# Patient Record
Sex: MALE | Race: AMERICAN INDIAN OR ALASKA NATIVE
[De-identification: names, ages, dates, MRNs, and addresses within clinical notes are randomized per-mention and may not be internally consistent; named-entity substitution may affect disease eponyms.]

---

## 2021-07-08 ENCOUNTER — HOSPITAL ENCOUNTER (EMERGENCY)
Dept: HOSPITAL 5 - ED | Age: 71
Discharge: HOME | End: 2021-07-08
Payer: MEDICARE

## 2021-07-08 VITALS — SYSTOLIC BLOOD PRESSURE: 143 MMHG | DIASTOLIC BLOOD PRESSURE: 77 MMHG

## 2021-07-08 DIAGNOSIS — J20.9: Primary | ICD-10-CM

## 2021-07-08 DIAGNOSIS — I10: ICD-10-CM

## 2021-07-08 DIAGNOSIS — E11.9: ICD-10-CM

## 2021-07-08 DIAGNOSIS — Z88.6: ICD-10-CM

## 2021-07-08 DIAGNOSIS — F17.200: ICD-10-CM

## 2021-07-08 DIAGNOSIS — Z98.890: ICD-10-CM

## 2021-07-08 LAB
ALBUMIN SERPL-MCNC: 3.4 G/DL (ref 3.9–5)
ALT SERPL-CCNC: 35 UNITS/L (ref 7–56)
APTT BLD: 32.4 SEC. (ref 24.2–36.6)
BAND NEUTROPHILS # (MANUAL): 0 K/MM3
BILIRUB UR QL STRIP: (no result)
BLOOD UR QL VISUAL: (no result)
BUN SERPL-MCNC: 11 MG/DL (ref 9–20)
BUN/CREAT SERPL: 11 %
CALCIUM SERPL-MCNC: 9.1 MG/DL (ref 8.4–10.2)
HCT VFR BLD CALC: 34.9 % (ref 35.5–45.6)
HEMOLYSIS INDEX: 3
HGB BLD-MCNC: 11.5 GM/DL (ref 11.8–15.2)
INR PPP: 1.01 (ref 0.87–1.13)
MCHC RBC AUTO-ENTMCNC: 33 % (ref 32–34)
MCV RBC AUTO: 83 FL (ref 84–94)
MYELOCYTES # (MANUAL): 0 K/MM3
PH UR STRIP: 6 [PH] (ref 5–7)
PLATELET # BLD: 118 K/MM3 (ref 140–440)
PROMYELOCYTES # (MANUAL): 0 K/MM3
PROT UR STRIP-MCNC: (no result) MG/DL
RBC # BLD AUTO: 4.21 M/MM3 (ref 3.65–5.03)
RBC #/AREA URNS HPF: 1 /HPF (ref 0–6)
TOTAL CELLS COUNTED BLD: 100
UROBILINOGEN UR-MCNC: 2 MG/DL (ref ?–2)
WBC #/AREA URNS HPF: < 1 /HPF (ref 0–6)

## 2021-07-08 PROCEDURE — 85025 COMPLETE CBC W/AUTO DIFF WBC: CPT

## 2021-07-08 PROCEDURE — 71046 X-RAY EXAM CHEST 2 VIEWS: CPT

## 2021-07-08 PROCEDURE — 93005 ELECTROCARDIOGRAM TRACING: CPT

## 2021-07-08 PROCEDURE — 36415 COLL VENOUS BLD VENIPUNCTURE: CPT

## 2021-07-08 PROCEDURE — 83735 ASSAY OF MAGNESIUM: CPT

## 2021-07-08 PROCEDURE — 85730 THROMBOPLASTIN TIME PARTIAL: CPT

## 2021-07-08 PROCEDURE — 81001 URINALYSIS AUTO W/SCOPE: CPT

## 2021-07-08 PROCEDURE — 83880 ASSAY OF NATRIURETIC PEPTIDE: CPT

## 2021-07-08 PROCEDURE — 80053 COMPREHEN METABOLIC PANEL: CPT

## 2021-07-08 PROCEDURE — 84484 ASSAY OF TROPONIN QUANT: CPT

## 2021-07-08 PROCEDURE — 85007 BL SMEAR W/DIFF WBC COUNT: CPT

## 2021-07-08 PROCEDURE — 85610 PROTHROMBIN TIME: CPT

## 2021-07-08 NOTE — EMERGENCY DEPARTMENT REPORT
- General


Chief Complaint: Dyspnea/Respdistress


Stated Complaint: SOB/ PNEUMONIA


Time Seen by Provider: 21 13:06


Source: patient


Mode of arrival: Ambulatory


Limitations: No Limitations





- History of Present Illness


Initial Comments: 





Patient is a 71-year-old F American male with past medical history of 

hypertension diabetes who is presenting with 2 days of increased cough and 

congestion.  Patient states his cough is productive of yellow sputum.  Mild 

shortness of breath with exertion.  States he has body aches but denies nausea 

vomiting diarrhea headache neck stiffness or sore throat.  Patient took his 

second dose of the [place] COVID-19 vaccine approximately 1 week ago.  Patient 

states he felt weak and fatigued for 2 days after receiving the shot then began 

feeling well until 2 days ago when his cough developed.  Patient has had some 

subjective chills but no documented fever.  Patient states he feels as though he

may have pneumonia as he has had pneumonia at least 3 times in the past several 

years.





- Related Data


                                    Allergies











Allergy/AdvReac Type Severity Reaction Status Date / Time


 


ibuprofen Allergy  Shortness Verified 21 12:58





   of Breath  














ED Review of Systems


ROS: 


Stated complaint: SOB/ PNEUMONIA


Other details as noted in HPI





Comment: All other systems reviewed and negative





ED Past Medical Hx





- Past Medical History


Hx Hypertension: Yes


Hx Diabetes: Yes


Hx Asthma: Yes


Additional medical history: PNEUMONIA X3





- Surgical History


Additional Surgical History: ROTATOR CUFF/ BACK/ KNEE REPLACEMENT





- Social History


Smoking Status: Current Every Day Smoker


Substance Use Type: None





ED Physical Exam





- General


Limitations: No Limitations


General appearance: alert, in no apparent distress





- Head


Head exam: Present: atraumatic, normocephalic





- Eye


Eye exam: Present: normal appearance





- ENT


ENT exam: Present: mucous membranes moist





- Neck


Neck exam: Present: normal inspection





- Respiratory


Respiratory exam: Present: normal lung sounds bilaterally, rhonchi (Fine rhonchi

diffuse which does clear after several deep breaths).  Absent: respiratory 

distress, wheezes, rales





- Cardiovascular


Cardiovascular Exam: Present: regular rate, normal rhythm, normal heart sounds. 

Absent: systolic murmur, diastolic murmur, rubs, gallop





- GI/Abdominal


GI/Abdominal exam: Present: soft, normal bowel sounds.  Absent: distended, 

tenderness, guarding, rebound





- Rectal


Rectal exam: Present: deferred





- Extremities Exam


Extremities exam: Present: normal inspection





- Back Exam


Back exam: Present: normal inspection





- Neurological Exam


Neurological exam: Present: alert, oriented X3





- Psychiatric


Psychiatric exam: Present: normal affect, normal mood





- Skin


Skin exam: Present: warm, dry, intact, normal color.  Absent: rash





ED Course


                                   Vital Signs











  21





  13:01 16:46 16:50


 


Temperature 98.5 F  


 


Pulse Rate 92 H 80 


 


Respiratory 20 18 19





Rate   


 


Blood Pressure 131/74 132/73 


 


O2 Sat by Pulse 99 100 





Oximetry   














ED Medical Decision Making





- Lab Data


Result diagrams: 


                                 21 13:11





                                 21 13:11








                                   Lab Results











  21 Range/Units





  13:11 13:11 13:11 


 


WBC  4.9    (4.5-11.0)  K/mm3


 


RBC  4.21    (3.65-5.03)  M/mm3


 


Hgb  11.5 L    (11.8-15.2)  gm/dl


 


Hct  34.9 L    (35.5-45.6)  %


 


MCV  83 L    (84-94)  fl


 


MCH  28    (28-32)  pg


 


MCHC  33    (32-34)  %


 


RDW  14.2    (13.2-15.2)  %


 


Plt Count  118 L    (140-440)  K/mm3


 


Mono % (Auto)  Np    


 


Add Manual Diff  Complete    


 


Total Counted  100    


 


Seg Neuts % (Manual)  56.0    (40.0-70.0)  %


 


Band Neutrophils %  1.0    %


 


Lymphocytes % (Manual)  16.0    (13.4-35.0)  %


 


Monocytes % (Manual)  11.0 H    (0.0-7.3)  %


 


Eosinophils % (Manual)  16.0 H    (0.0-4.3)  %


 


Nucleated RBC %  Not Reportable    


 


Seg Neutrophils # Man  2.7    (1.8-7.7)  K/mm3


 


Band Neutrophils #  0.0    K/mm3


 


Lymphocytes # (Manual)  0.8 L    (1.2-5.4)  K/mm3


 


Abs React Lymphs (Man)  0.0    K/mm3


 


Monocytes # (Manual)  0.5    (0.0-0.8)  K/mm3


 


Eosinophils # (Manual)  0.8 H    (0.0-0.4)  K/mm3


 


Basophils # (Manual)  0.0    (0.0-0.1)  K/mm3


 


Metamyelocytes #  0.0    K/mm3


 


Myelocytes #  0.0    K/mm3


 


Promyelocytes #  0.0    K/mm3


 


Blast Cells #  0.0    K/mm3


 


WBC Morphology  Not Reportable    


 


Hypersegmented Neuts  Not Reportable    


 


Hyposegmented Neuts  Not Reportable    


 


Hypogranular Neuts  Not Reportable    


 


Smudge Cells  Not Reportable    


 


Toxic Granulation  Not Reportable    


 


Toxic Vacuolation  Not Reportable    


 


Dohle Bodies  Not Reportable    


 


Pelger-Huet Anomaly  Not Reportable    


 


[place] Rods  Not Reportable    


 


Platelet Estimate  Consistent w auto    


 


Clumped Platelets  Not Reportable    


 


Plt Clumps, EDTA  Not Reportable    


 


Large Platelets  Not Reportable    


 


Giant Platelets  Not Reportable    


 


Platelet Satelliting  Not Reportable    


 


Plt Morphology Comment  Not Reportable    


 


RBC Morphology  Normal    


 


Dimorphic RBCs  Not Reportable    


 


Polychromasia  Not Reportable    


 


Hypochromasia  Not Reportable    


 


Poikilocytosis  Not Reportable    


 


Anisocytosis  Not Reportable    


 


Microcytosis  Not Reportable    


 


Macrocytosis  Not Reportable    


 


Spherocytes  Not Reportable    


 


Pappenheimer Bodies  Not Reportable    


 


Sickle Cells  Not Reportable    


 


Target Cells  Not Reportable    


 


Tear Drop Cells  Not Reportable    


 


Ovalocytes  Not Reportable    


 


Helmet Cells  Not Reportable    


 


[place] Bodies  Not Reportable    


 


Cabot Rings  Not Reportable    


 


Ga Cells  Not Reportable    


 


Bite Cells  Not Reportable    


 


Crenated Cell  Not Reportable    


 


Elliptocytes  Not Reportable    


 


Acanthocytes (Spur)  Not Reportable    


 


Rouleaux  Not Reportable    


 


Hemoglobin C Crystals  Not Reportable    


 


Schistocytes  Not Reportable    


 


Malaria parasites  Not Reportable    


 


[place] Bodies  Not Reportable    


 


Hem Pathologist Commnt  No    


 


PT   13.9   (12.2-14.9)  Sec.


 


INR   1.01   (0.87-1.13)  


 


APTT   32.4   (24.2-36.6)  Sec.


 


Sodium    137  (137-145)  mmol/L


 


Potassium    4.4  (3.6-5.0)  mmol/L


 


Chloride    103.6  ()  mmol/L


 


Carbon Dioxide    22  (22-30)  mmol/L


 


Anion Gap    16  mmol/L


 


BUN    11  (9-20)  mg/dL


 


Creatinine    1.0  (0.8-1.3)  mg/dL


 


Estimated GFR    > 60  ml/min


 


BUN/Creatinine Ratio    11  %


 


Glucose    151 H  ()  mg/dL


 


Calcium    9.1  (8.4-10.2)  mg/dL


 


Magnesium    1.60 L  (1.7-2.3)  mg/dL


 


Total Bilirubin    0.40  (0.1-1.2)  mg/dL


 


AST    43 H  (5-40)  units/L


 


ALT    35  (7-56)  units/L


 


Alkaline Phosphatase    120  ()  units/L


 


Troponin T    < 0.010  (0.00-0.029)  ng/mL


 


NT-Pro-B Natriuret Pep    267.7  (0-900)  pg/mL


 


Total Protein    8.0  (6.3-8.2)  g/dL


 


Albumin    3.4 L  (3.9-5)  g/dL


 


Albumin/Globulin Ratio    0.7  %


 


Urine Color     (Yellow)  


 


Urine Turbidity     (Clear)  


 


Urine pH     (5.0-7.0)  


 


Ur Specific Gravity     (1.003-1.030)  


 


Urine Protein     (Negative)  mg/dL


 


Urine Glucose (UA)     (Negative)  mg/dL


 


Urine Ketones     (Negative)  mg/dL


 


Urine Blood     (Negative)  


 


Urine Nitrite     (Negative)  


 


Urine Bilirubin     (Negative)  


 


Urine Urobilinogen     (<2.0)  mg/dL


 


Ur Leukocyte Esterase     (Negative)  


 


Urine WBC (Auto)     (0.0-6.0)  /HPF


 


Urine RBC (Auto)     (0.0-6.0)  /HPF














  21 Range/Units





  Unknown 


 


WBC   (4.5-11.0)  K/mm3


 


RBC   (3.65-5.03)  M/mm3


 


Hgb   (11.8-15.2)  gm/dl


 


Hct   (35.5-45.6)  %


 


MCV   (84-94)  fl


 


MCH   (28-32)  pg


 


MCHC   (32-34)  %


 


RDW   (13.2-15.2)  %


 


Plt Count   (140-440)  K/mm3


 


Mono % (Auto)   


 


Add Manual Diff   


 


Total Counted   


 


Seg Neuts % (Manual)   (40.0-70.0)  %


 


Band Neutrophils %   %


 


Lymphocytes % (Manual)   (13.4-35.0)  %


 


Monocytes % (Manual)   (0.0-7.3)  %


 


Eosinophils % (Manual)   (0.0-4.3)  %


 


Nucleated RBC %   


 


Seg Neutrophils # Man   (1.8-7.7)  K/mm3


 


Band Neutrophils #   K/mm3


 


Lymphocytes # (Manual)   (1.2-5.4)  K/mm3


 


Abs React Lymphs (Man)   K/mm3


 


Monocytes # (Manual)   (0.0-0.8)  K/mm3


 


Eosinophils # (Manual)   (0.0-0.4)  K/mm3


 


Basophils # (Manual)   (0.0-0.1)  K/mm3


 


Metamyelocytes #   K/mm3


 


Myelocytes #   K/mm3


 


Promyelocytes #   K/mm3


 


Blast Cells #   K/mm3


 


WBC Morphology   


 


Hypersegmented Neuts   


 


Hyposegmented Neuts   


 


Hypogranular Neuts   


 


Smudge Cells   


 


Toxic Granulation   


 


Toxic Vacuolation   


 


Dohle Bodies   


 


Pelger-Huet Anomaly   


 


Shabana Rods   


 


Platelet Estimate   


 


Clumped Platelets   


 


Plt Clumps, EDTA   


 


Large Platelets   


 


Giant Platelets   


 


Platelet Satelliting   


 


Plt Morphology Comment   


 


RBC Morphology   


 


Dimorphic RBCs   


 


Polychromasia   


 


Hypochromasia   


 


Poikilocytosis   


 


Anisocytosis   


 


Microcytosis   


 


Macrocytosis   


 


Spherocytes   


 


Pappenheimer Bodies   


 


Sickle Cells   


 


Target Cells   


 


Tear Drop Cells   


 


Ovalocytes   


 


Helmet Cells   


 


[place] Bodies   


 


Cabot Rings   


 


[place] Cells   


 


Bite Cells   


 


Crenated Cell   


 


Elliptocytes   


 


Acanthocytes (Spur)   


 


Rouleaux   


 


Hemoglobin C Crystals   


 


Schistocytes   


 


Malaria parasites   


 


[place] Bodies   


 


Hem Pathologist Commnt   


 


PT   (12.2-14.9)  Sec.


 


INR   (0.87-1.13)  


 


APTT   (24.2-36.6)  Sec.


 


Sodium   (137-145)  mmol/L


 


Potassium   (3.6-5.0)  mmol/L


 


Chloride   ()  mmol/L


 


Carbon Dioxide   (22-30)  mmol/L


 


Anion Gap   mmol/L


 


BUN   (9-20)  mg/dL


 


Creatinine   (0.8-1.3)  mg/dL


 


Estimated GFR   ml/min


 


BUN/Creatinine Ratio   %


 


Glucose   ()  mg/dL


 


Calcium   (8.4-10.2)  mg/dL


 


Magnesium   (1.7-2.3)  mg/dL


 


Total Bilirubin   (0.1-1.2)  mg/dL


 


AST   (5-40)  units/L


 


ALT   (7-56)  units/L


 


Alkaline Phosphatase   ()  units/L


 


Troponin T   (0.00-0.029)  ng/mL


 


NT-Pro-B Natriuret Pep   (0-900)  pg/mL


 


Total Protein   (6.3-8.2)  g/dL


 


Albumin   (3.9-5)  g/dL


 


Albumin/Globulin Ratio   %


 


Urine Color  Yellow  (Yellow)  


 


Urine Turbidity  Clear  (Clear)  


 


Urine pH  6.0  (5.0-7.0)  


 


Ur Specific Gravity  1.016  (1.003-1.030)  


 


Urine Protein  <15 mg/dl  (Negative)  mg/dL


 


Urine Glucose (UA)  Neg  (Negative)  mg/dL


 


Urine Ketones  Neg  (Negative)  mg/dL


 


Urine Blood  Neg  (Negative)  


 


Urine Nitrite  Neg  (Negative)  


 


Urine Bilirubin  Neg  (Negative)  


 


Urine Urobilinogen  2.0  (<2.0)  mg/dL


 


Ur Leukocyte Esterase  Neg  (Negative)  


 


Urine WBC (Auto)  < 1.0  (0.0-6.0)  /HPF


 


Urine RBC (Auto)  1.0  (0.0-6.0)  /HPF














- Radiology Data


Radiology results: report reviewed, image reviewed (Agree with the 

interpretation of the chest x-ray)





[facility]  


                                     [address] [place] [address]  


 


                                            XRay Report   


                                               Signed  


 


Patient: DAVID BOYLE                                                      

         MR#:   


[id number]          


: 1950                                                                

Acct:[id number]      


 


Age/Sex: 71 / M                                                                

ADM Date: 21     


 


Loc: ED       


Attending Dr:   


 


 


Ordering Physician: RANDAL ARCHER NP  


Date of Service: 21  


Procedure(s): XR chest routine 2V  


Accession Number(s): [id number]  


 


cc: RANDAL ARCHER NP   


 


Fluoro Time In Minutes:   


 


CHEST 2 VIEWS   


 


 INDICATION / CLINICAL INFORMATION: Chest Pain.  


 


 COMPARISON: None available.  


 


 FINDINGS:  


 


 SUPPORT DEVICES: None.  


 HEART / MEDIASTINUM: No significant abnormality.   


 LUNGS / PLEURA: No significant pulmonary or pleural abnormality. No 

pneumothorax.   


 


 ADDITIONAL FINDINGS: No significant additional findings.  


 


 IMPRESSION:  


 1. No acute findings.  


 


 Signer Name: Alessandro Schwartz MD   


 Signed: 2021 1:40 PM  


 Workstation Name: [email or web address] 





- Medical Decision Making





Patient is a 71-year-old F American male is presenting with cough cold 

congestion.  Patient states he has some mild shortness of breath however his O2 

sats were in the upper 90s throughout his stay even with ambulation.  Patient 

likely with an acute bronchitis.  It is still possible the patient has COVID-19 

and has been encouraged to get outpatient COVID-19 test.  Patient given 

medication for symptomatic relief but appears stable for discharge.


Critical care attestation.: 


If time is entered above; I have spent that time in minutes in the direct care 

of this critically ill patient, excluding procedure time.








ED Disposition


Clinical Impression: 


 Acute bronchitis





Disposition: DC-01 TO HOME OR SELFCARE


Is pt being admited?: No


Does the pt Need Aspirin: No


Condition: Stable


Instructions:  Acute Bronchitis (ED), Acute Bronchitis, Adult, Easy-to-Read


Additional Instructions: 


Because it has not been 2 weeks since your second dose of the COVID-19 vaccine 

it is still possible that your illness is secondary to COVID-19.  Please follow-

up with a local urgent care your primary care physician to get outpatient COVID-

19 testing.  Please quarantine until results return.  Medications for 

symptomatic relief have been prescribed for you.  Please buy a home pulse 

oximeter (a picture has been included in your paperwork).  Please return if your

oxygen level is less than 90%. 


Referrals: 


PRIMARY CARE,MD [Primary Care Provider] - 3-5 Days


Time of Disposition: 17:21

## 2021-07-08 NOTE — EMERGENCY DEPARTMENT REPORT
Blank Doc





- Documentation


Documentation: 





71-year-old male that presents with chest pain, shortness of breath.





1- This is a initial triage assessment/medical screening only. Full assessment 

and work-up will be completed once the patient is in proper hospital gown, ED 

bed and in a private room setting.  This initial assessment/diagnostic 

orders/clinical plan/ treatment(s) is/are subject to change based on pt's health

status, clinical progression and re-assessment by fellow clinical providers in 

the ED. Further treatment and workup at subsequent clinical providers 

discretion. Patient/guardians urged not to elope from ED as their condition may 

be serious if not clinically assessed and managed. 


2-cardiac workup

## 2021-07-08 NOTE — XRAY REPORT
CHEST 2 VIEWS 



INDICATION / CLINICAL INFORMATION: Chest Pain.



COMPARISON: None available.



FINDINGS:



SUPPORT DEVICES: None.

HEART / MEDIASTINUM: No significant abnormality. 

LUNGS / PLEURA: No significant pulmonary or pleural abnormality. No pneumothorax. 



ADDITIONAL FINDINGS: No significant additional findings.



IMPRESSION:

1. No acute findings.



Signer Name: Alessandro Schwartz MD 

Signed: 7/8/2021 1:40 PM

Workstation Name: [email or web address] [id number]

## 2021-07-09 NOTE — ELECTROCARDIOGRAPH REPORT
[facility]

                                       

Test Date:    2021               Test Time:    13:07:29

Pat Name:     DAVID BOYLE           Department:   

Patient ID:   [id number]          Room:          

Gender:       M                        Technician:   HALEY FOWLERB:          1950               Requested By: ED DOC

Order Number: [id number]              Reading MD:   Kristina Blackwell

                                 Measurements

Intervals                              Axis          

Rate:         93                       P:            82

WA:           171                      QRS:          27

QRSD:         76                       T:            59

QT:           326                                    

QTc:          406                                    

                           Interpretive Statements

Sinus rhythm

No previous ECG available for comparison

Electronically Signed On 2021 10:35:31 EDT by Kristina Blackwell

## 2021-12-11 ENCOUNTER — HOSPITAL ENCOUNTER (EMERGENCY)
Dept: HOSPITAL 5 - ED | Age: 71
Discharge: HOME | End: 2021-12-11
Payer: MEDICARE

## 2021-12-11 VITALS — SYSTOLIC BLOOD PRESSURE: 138 MMHG | DIASTOLIC BLOOD PRESSURE: 42 MMHG
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
## 2021-12-11 DIAGNOSIS — E11.8: ICD-10-CM
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
## 2021-12-11 DIAGNOSIS — F17.200: ICD-10-CM

## 2021-12-11 DIAGNOSIS — J45.909: ICD-10-CM

## 2021-12-11 DIAGNOSIS — S89.92XA: Primary | ICD-10-CM

## 2021-12-11 DIAGNOSIS — Z88.6: ICD-10-CM

## 2021-12-11 DIAGNOSIS — I10: ICD-10-CM

## 2021-12-11 PROCEDURE — 99283 EMERGENCY DEPT VISIT LOW MDM: CPT

## 2021-12-11 NOTE — XRAY REPORT
LEFT TIBIA-FIBULA 2 VIEW(S)



INDICATION / CLINICAL INFORMATION: pain after fall



COMPARISON: None available.

 

FINDINGS:



BONES / JOINT(S): No acute fracture or subluxation. There is mild degenerative change in the ankle albin
int.



SOFT TISSUES: No significant abnormality.



ADDITIONAL FINDINGS: None.







Signer Name: Reggie Astudillo MD 

Signed: 12/11/2021 1:56 PM

Workstation Name: [email or web address]

## 2021-12-11 NOTE — EMERGENCY DEPARTMENT REPORT
ED General Adult HPI





- General


Chief complaint: Fall


Stated complaint: SHOULDER PAIN AND LEG


Time Seen by Provider: 12/11/21 12:46


Source: patient


Mode of arrival: Ambulatory


Limitations: No Limitations





- History of Present Illness


Initial comments: 





71-year-old -American male patient presents with complaints of left knee 

and right shoulder pain after a slip and fall injury yesterday.  Patient states 

he was dancing and slipped and fell.  He denies any head trauma, loss of 

consciousness, chest pain, shortness of breath, or preceding symptoms.  Patient 

has history of a left knee replacement and a right shoulder surgery.  He denies 

any numbness/tingling/weakness in his limbs.  Pain in the knee worsens with 

ambulation and to touch





- Related Data


                                  Previous Rx's











 Medication  Instructions  Recorded  Last Taken  Type


 


Albuterol Mdi (or & Nicu Only) 2 puff IH QID PRN #1 inhalation 07/08/21 Unknown 

Rx





[ProAir HFA Inhaler]    


 


Benzonatate [Tessalon Perles] 100 mg PO Q8HR #10 capsule 07/08/21 Unknown Rx


 


HYDROcodone/APAP 5-325 [[place] 1 each PO Q6HR PRN #14 tablet 07/08/21 Unknown Rx





5/325]    


 


predniSONE [Deltasone] 50 mg PO QDAY #5 tab 07/08/21 Unknown Rx


 


HYDROcodone/APAP 5-325 [[place] 1 each PO Q6HR PRN #15 tablet 12/11/21 Unknown Rx





5-325 mg TAB]    











                                    Allergies











Allergy/AdvReac Type Severity Reaction Status Date / Time


 


ibuprofen Allergy  Shortness Verified 07/08/21 12:58





   of Breath  














ED Review of Systems


ROS: 


Stated complaint: SHOULDER PAIN AND LEG


Other details as noted in HPI





Constitutional: denies: diaphoresis, malaise, weakness


Respiratory: denies: cough, shortness of breath


Cardiovascular: denies: chest pain


Gastrointestinal: denies: abdominal pain


Musculoskeletal: joint swelling, arthralgia


Skin: denies: change in color


Neurological: denies: numbness, paresthesias


Hematological/Lymphatic: denies: swollen glands





ED Past Medical Hx





- Past Medical History


Hx Hypertension: Yes


Hx Diabetes: Yes


Hx Asthma: Yes


Additional medical history: PNEUMONIA X3





- Surgical History


Additional Surgical History: ROTATOR CUFF/ BACK/ KNEE REPLACEMENT





- Social History


Smoking Status: Current Every Day Smoker


Substance Use Type: None





- Medications


Home Medications: 


                                Home Medications











 Medication  Instructions  Recorded  Confirmed  Last Taken  Type


 


Albuterol Mdi (or & Nicu Only) 2 puff IH QID PRN #1 inhalation 07/08/21  Unknown

Rx





[ProAir HFA Inhaler]     


 


Benzonatate [Tessalon Perles] 100 mg PO Q8HR #10 capsule 07/08/21  Unknown Rx


 


HYDROcodone/APAP 5-325 [[place] 1 each PO Q6HR PRN #14 tablet 07/08/21  Unknown Rx





5/325]     


 


predniSONE [Deltasone] 50 mg PO QDAY #5 tab 07/08/21  Unknown Rx


 


HYDROcodone/APAP 5-325 [[place] 1 each PO Q6HR PRN #15 tablet 12/11/21  Unknown Rx





5-325 mg TAB]     














ED Physical Exam





- General


Limitations: No Limitations


General appearance: alert, in no apparent distress





- Head


Head exam: Present: atraumatic, normocephalic





- Eye


Eye exam: Present: normal appearance





- Respiratory


Respiratory exam: Absent: respiratory distress





- Cardiovascular


Cardiovascular Exam: Present: regular rate





- Extremities Exam


Extremities exam: Present: other (Tenderness palpation noted to right humeral 

head without obvious deformity; patient has full range of motion of the right 

shoulder)





- Expanded Lower Extremity Exam


  ** Left


Knee exam: Present: full ROM, tenderness, swelling (Mild).  Absent: deformity, 

erythema


Lower Leg exam: Present: normal inspection


Neuro vascular tendon exam: Present: no vascular compromise.  Absent: pulse 

deficit


Gait: Negative: observed and normal (Patient ambulates via cane)





- Neurological Exam


Neurological exam: Present: alert, oriented X3





- Psychiatric


Psychiatric exam: Present: normal affect, normal mood





- Skin


Skin exam: Present: warm, dry, intact, normal color.  Absent: rash





ED Course





                                   Vital Signs











  12/11/21





  12:10


 


Temperature 97.9 F


 


Pulse Rate 62


 


Respiratory 18





Rate 


 


Blood Pressure 135/69


 


O2 Sat by Pulse 99





Oximetry 














ED Medical Decision Making





- Radiology Data


Radiology results: report reviewed





LEFT TIBIA-FIBULA 2 VIEW(S)  


 


 INDICATION / CLINICAL INFORMATION: pain after fall  


 


 COMPARISON: None available.  


 


 FINDINGS:  


 


 BONES / JOINT(S): No acute fracture or subluxation. There is mild degenerative 

change in the ankle 


joint.  


 


 SOFT TISSUES: No significant abnormality.  


 


 ADDITIONAL FINDINGS: None.  


 


 


  


LEFT KNEE 4 VIEW(S)  


 


 INDICATION / CLINICAL INFORMATION: pain after fall  


 


 COMPARISON: None available.  


 


 FINDINGS:  


 


 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 The patella appears

to be displaced 


inferiorly. This is nonspecific but could indicate quadriceps tendon injury.   


 


 SOFT TISSUES: No significant abnormality.  


 


 ADDITIONAL FINDINGS: None.  


 


 


 RIGHT SHOULDER 3 VIEW(S)  


 


 INDICATION / CLINICAL INFORMATION: pain after fall  


 


 COMPARISON: None available.  


 


 FINDINGS:  


 


 BONES / JOINT(S): No acute fracture or subluxation. There is relatively 

advanced degenerative 


change of the glenohumeral joint.  


 


 SOFT TISSUES: No significant abnormality.  


 


 ADDITIONAL FINDINGS: None.  


 


 








RIGHT SHOULDER 3 VIEW(S)  


 


 INDICATION / CLINICAL INFORMATION: pain after fall  


 


 COMPARISON: None available.  


 


 FINDINGS:  


 


 BONES / JOINT(S): No acute fracture or subluxation. There is relatively 

advanced degenerative 


change of the glenohumeral joint.  


 


 SOFT TISSUES: No significant abnormality.  


 


 ADDITIONAL FINDINGS: None.  


 





- Medical Decision Making








71-year-old -American male patient presents with complaints of left knee 

and right shoulder pain after a slip and fall injury yesterday.  Patient states 

he was dancing and slipped and fell.  He denies any head trauma, loss of 

consciousness, chest pain, shortness of breath, or preceding symptoms.  Patient 

has history of a left knee replacement and a right shoulder surgery.  He denies 

any numbness/tingling/weakness in his limbs.  Pain in the knee worsens with 

ambulation and to touch








X-ray shows possible inferior dislocation of the patella no obvious deformities 

of the knee are noted.  Discussed patient with Dr. Meraz-recommends knee brace

and crutches and follow-up first thing morning with Ortho.  Discussed findings 

with patient, care plan, and signs and symptoms that should prompt immediate 

return to the ED, he verbalizes understanding.


Critical care attestation.: 


If time is entered above; I have spent that time in minutes in the direct care 

of this critically ill patient, excluding procedure time.








ED Disposition


Clinical Impression: 


 Knee injury





Disposition: 01 HOME / SELF CARE / HOMELESS


Is pt being admited?: No


Condition: Stable


Instructions:  Patellar Dislocation, Easy-to-Read


Prescriptions: 


HYDROcodone/APAP 5-325 [Norco 5-325 mg TAB] 1 each PO Q6HR PRN #15 tablet


 PRN Reason: Pain


Referrals: 


KARINA HANSEN MD [Staff Physician] - 12/13/21


[facility] [Provider Group] - 12/13/21

## 2021-12-11 NOTE — XRAY REPORT
LEFT KNEE 4 VIEW(S)



INDICATION / CLINICAL INFORMATION: pain after fall



COMPARISON: None available.

 

FINDINGS:



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 The patella appears to be displaced infer
iorly. This is nonspecific but could indicate quadriceps tendon injury. 



SOFT TISSUES: No significant abnormality.



ADDITIONAL FINDINGS: None.







Signer Name: Reggie Astudillo MD 

Signed: 12/11/2021 1:55 PM

Workstation Name: [id number]

## 2021-12-11 NOTE — XRAY REPORT
RIGHT SHOULDER 3 VIEW(S)



INDICATION / CLINICAL INFORMATION: pain after fall



COMPARISON: None available.

 

FINDINGS:



BONES / JOINT(S): No acute fracture or subluxation. 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 
of the glenohumeral joint.



SOFT TISSUES: No significant abnormality.



ADDITIONAL FINDINGS: None.







Signer Name: Reggie Astudillo MD 

Signed: 12/11/2021 1:57 PM

Workstation Name: [id number]